# Patient Record
Sex: FEMALE | Race: WHITE
[De-identification: names, ages, dates, MRNs, and addresses within clinical notes are randomized per-mention and may not be internally consistent; named-entity substitution may affect disease eponyms.]

---

## 2018-10-18 ENCOUNTER — HOSPITAL ENCOUNTER (OUTPATIENT)
Dept: HOSPITAL 80 - FIMAGING | Age: 48
End: 2018-10-18
Payer: COMMERCIAL

## 2018-10-18 DIAGNOSIS — Z12.31: Primary | ICD-10-CM

## 2018-10-31 ENCOUNTER — HOSPITAL ENCOUNTER (OUTPATIENT)
Dept: HOSPITAL 80 - FIMAGING | Age: 48
End: 2018-10-31
Payer: COMMERCIAL

## 2018-10-31 DIAGNOSIS — R92.8: Primary | ICD-10-CM

## 2019-04-11 ENCOUNTER — HOSPITAL ENCOUNTER (EMERGENCY)
Dept: HOSPITAL 80 - FED | Age: 49
Discharge: HOME | End: 2019-04-11
Payer: COMMERCIAL

## 2019-04-11 VITALS — SYSTOLIC BLOOD PRESSURE: 148 MMHG | DIASTOLIC BLOOD PRESSURE: 95 MMHG

## 2019-04-11 DIAGNOSIS — M25.512: ICD-10-CM

## 2019-04-11 DIAGNOSIS — R42: ICD-10-CM

## 2019-04-11 DIAGNOSIS — M51.37: Primary | ICD-10-CM

## 2019-04-11 DIAGNOSIS — M24.852: ICD-10-CM

## 2019-04-11 DIAGNOSIS — E86.9: ICD-10-CM

## 2019-04-11 DIAGNOSIS — Q65.89: ICD-10-CM

## 2019-04-11 LAB — PLATELET # BLD: 352 10^3/UL (ref 150–400)

## 2019-04-11 NOTE — EDPHY
H & P


Stated Complaint: L shoulder pain


Time Seen by Provider: 04/11/19 11:22


HPI/ROS: 


HPI:  This is a 48-year-old female who presents with





Chief Complaint:  Dizziness, left shoulder and left hip pain





Location:  Body


Quality:  Dizziness, pain


Duration:  40 min prior to arrival


Signs and Symptoms:  no shortness of breath at rest, no shortness of breath on 

exertion, no cough, no chest pain, + palpitations, no lower extremity edema, no 

wheezing, no orthopnea, no paroxysmal nocturnal dyspnea, no fever, no injury/

trauma, no hemoptysis, no carpal pedal spasms


Timing:  Acute, constant, lasting approximately 20-30 minutes


Severity:  Mild-to-moderate


Context:  Patient was driving down the [place] for approximately 40 min when she 

started to developed dizziness described as lightheadedness and left shoulder 

pain at the exact same time as left hip pain that radiated down the back of her 

leg into her knee.  She reports that she felt like her heart was racing and 

complained of palpitations.  She reports that she travels a lot for work and is 

under considerable stress.  She has not ate or drank anything today.  Last TSH 

level was performed 2 weeks ago and is followed by her primary care provider.  

Patient reports that 3-5 times per week she wakes up right hip discomfort and 

lower back pain that radiates into her right leg but has never had any imaging 

performed.  Denies any recent trauma or injury.  No fever and no recent upper 

respiratory symptoms.  Denies chest pain, shortness of breath, lower extremity 

edema, calf pain.  Denies urinary symptoms including no dysuria, urinary 

hesitancy, urinary frequency.


Modifying Factors:  None





Comment: 








ROS:  A comprehensive 10 system review of systems is otherwise negative aside 

from elements mentioned in the history of present illness. 





MEDICAL/SURGICAL/SOCIAL HISTORY: 


Medical history:  Hypothyroidism


Surgical history:  Denies


Social history:  Employed.  Never smoked.











CONSTITUTIONAL:  Overweight but nontoxic-appearing polite and cooperative female

, awake and alert, no obvious distress


HEENT: Atraumatic and normocephalic, PERRL, EOMI.  Nares patent; no rhinorrhea;

  no nasal mucosal edema. Tympanic membranes clear. Oropharynx clear, no 

exudate and moist pink mucosa.  Airway patent.  No lymphadenopathy.  No 

meningismus.  No carotid bruits.


Cardiovascular: Normal S1/S2, regular rate, regular rhythm, without murmur rub 

or gallop.


PULMONARY/CHEST:  Symmetrical and nontender. Clear to auscultation bilaterally. 

Good air movement. No accessory muscle usage.


ABDOMEN:  Soft, nondistended, nontender, no rebound, no guarding, no peritoneal 

signs, no masses or organomegaly. No CVAT.


BACK:  No midline tenderness, no paraspinous spasm, deep tendon reflexes 2/2, 

no pain with straight leg raise, No foot drop.  Achilles reflexes are equal 

bilaterally.  Able to walk on heels and toes without difficulty.


EXTREMITIES:  2/2 pulses, strength 5/5, bilateral HIP:  Flexion to 125, 

extension to 115, hyper extension to 15, abduction to 45. Pain with internal 

rotation and external rotation.  Mild tenderness over greater trochanter. no 

deformities, no clubbing, no cyanosis or edema.


NEUROLOGICAL: no focal neuro deficits.  GCS 15. Cranial nerves 2-12 grossly 

intact.


SKIN: Warm and dry, no erythema. no rash.  Good capillary refill. 








  





Source: Patient


Exam Limitations: No limitations





- Personal History


Current Tetanus/Diphtheria Vaccine: No


Current Tetanus Diphtheria and Acellular Pertussis (TDAP): No





- Medical/Surgical History


Hx Asthma: No


Hx Chronic Respiratory Disease: No


Hx Diabetes: No


Hx Cardiac Disease: No


Hx Renal Disease: No


Hx Cirrhosis: No


Hx Alcoholism: No


Hx HIV/AIDS: No


Hx Splenectomy or Spleen Trauma: No


Other PMH: hypothyroid





- Social History


Smoking Status: Never smoked


Constitutional: 


 Initial Vital Signs











Temperature (C)  37.4 C   04/11/19 11:16


 


Heart Rate  92   04/11/19 11:16


 


Respiratory Rate  16   04/11/19 11:16


 


Blood Pressure  168/107 H  04/11/19 11:16


 


O2 Sat (%)  95   04/11/19 11:16








 











O2 Delivery Mode               Room Air














Allergies/Adverse Reactions: 


 





No Known Allergies Allergy (Unverified 04/11/19 11:16)


 








Home Medications: 














 Medication  Instructions  Recorded


 


Levothyroxine  04/11/19














Medical Decision Making





- Diagnostics


EKG Interpretation: 


12 lead EKG:


Indication:  Dizziness


Rhythm:  Normal sinus rhythm, rate of 85 beats per minute


Axis:  Normal


Intervals:  Normal


QRS:  Normal


ST segments:  Nonspecific changes


T segments:  Inverted T-wave in V2


INTERPRETATION:  No acute ischemic changes


The 12 lead EKG was interpreted by myself and with attending.





Imaging Results: 


 Imaging Impressions





Lumbar Spine X-Ray  04/11/19 11:27


Impression: Degenerative disk disease at L5-S1.


 


2. AP Pelvis


 


History: Left hip pain


 


Findings: There is mild hypertrophic change of each anterior superior iliac 

crest. The patient has congenitally short acetabuli. Both femoral necks are 

congenitally under tubulated. The femoral heads are normally located. The hip 

joint cartilage spaces are symmetric and normal. There is no soft tissue 

calcification or ossification. There is no sclerosis, subcortical cyst formation

, spurring or erosive change. The SI joints and pubic symphysis look normal. 

There is no evidence for ischemic necrosis.


 


Impression: Anatomy that would predispose this patient to acetabular labral 

degeneration.








Pelvis X-Ray  04/11/19 11:27


Impression: Degenerative disk disease at L5-S1.


 


2. AP Pelvis


 


History: Left hip pain


 


Findings: There is mild hypertrophic change of each anterior superior iliac 

crest. The patient has congenitally short acetabuli. Both femoral necks are 

congenitally under tubulated. The femoral heads are normally located. The hip 

joint cartilage spaces are symmetric and normal. There is no soft tissue 

calcification or ossification. There is no sclerosis, subcortical cyst formation

, spurring or erosive change. The SI joints and pubic symphysis look normal. 

There is no evidence for ischemic necrosis.


 


Impression: Anatomy that would predispose this patient to acetabular labral 

degeneration.











ED Course/Re-evaluation: 


Vital signs reviewed and shows elevated blood pressure upon arrival.  Placed on 

cardiac monitor.


IV access, laboratory studies, urinalysis, EKG, lumbar sacral x-rays, pelvic x-

rays ordered


Given 1 L normal saline


1140:  EKG my read shows no acute ischemic changes, no arrhythmias, no heart 

block.


1208:  Notified by tech that troponin 0.01


Urinalysis is unremarkable.


1215:  Laboratory studies reviewed.  No signs of leukocytosis/anemia/platelet 

dysfunction/GAUTAM/elevated LFTs/electrolyte imbalance/VTE. 


Lumbosacral x-rays my read show degenerative changes and stenosis at the L5-S1.

  Hip x-ray shows shortened acetabulum with concerns for labral derangement 

versus mild degenerative changes.


1220: Patient would benefit from a referral to Orthopedics.





No signs of neurovascular compromise/tenting of skin/compartment syndrome/

extremities and joints examined above and below area of concern and are 

neurovascularly intact/CVA/TIA/acute coronary syndrome/sepsis. 





This patient was seen under the supervision of my secondary supervising 

physician.  I evaluated care for this patient independently.  





Differential Diagnosis: 


Dizziness including but not limited to peripheral and central causes of vertigo

, orthostatic causes including dehydration, and blood loss.








- Data Points


Laboratory Results: 


 Laboratory Results





 04/11/19 11:44 





 04/11/19 11:44 





 











  04/11/19 04/11/19 04/11/19





  11:47 11:44 11:44


 


WBC      





    


 


RBC      





    


 


Hgb      





    


 


Hct      





    


 


MCV      





    


 


MCH      





    


 


MCHC      





    


 


RDW      





    


 


Plt Count      





    


 


MPV      





    


 


Neut % (Auto)      





    


 


Lymph % (Auto)      





    


 


Mono % (Auto)      





    


 


Eos % (Auto)      





    


 


Baso % (Auto)      





    


 


Nucleat RBC Rel Count      





    


 


Absolute Neuts (auto)      





    


 


Absolute Lymphs (auto)      





    


 


Absolute Monos (auto)      





    


 


Absolute Eos (auto)      





    


 


Absolute Basos (auto)      





    


 


Absolute Nucleated RBC      





    


 


Immature Gran %      





    


 


Immature Gran #      





    


 


D-Dimer      





    


 


Sodium      135 mEq/L mEq/L





     (135-145) 


 


Potassium      4.3 mEq/L mEq/L





     (3.5-5.2) 


 


Chloride      101 mEq/L mEq/L





     () 


 


Carbon Dioxide      25 mEq/l mEq/l





     (22-31) 


 


Anion Gap      9 mEq/L mEq/L





     (6-14) 


 


BUN      11 mg/dL mg/dL





     (7-23) 


 


Creatinine      0.7 mg/dL mg/dL





     (0.6-1.0) 


 


Estimated GFR      > 60 





    


 


Glucose      100 mg/dL mg/dL





     () 


 


Calcium      9.3 mg/dL mg/dL





     (8.5-10.4) 


 


Total Bilirubin      0.4 mg/dL mg/dL





     (0.1-1.4) 


 


Conjugated Bilirubin      0.3 mg/dL mg/dL





     (0.0-0.5) 


 


Unconjugated Bilirubin      0.1 mg/dL mg/dL





     (0.0-1.1) 


 


AST      23 IU/L IU/L





     (14-46) 


 


ALT      46 IU/L IU/L





     (9-52) 


 


Alkaline Phosphatase      68 IU/L IU/L





     () 


 


POC Troponin I  0.01 ng/mL ng/mL    





   (0.00-0.08)   


 


Total Protein      7.5 g/dL g/dL





     (6.3-8.2) 


 


Albumin      4.6 g/dL g/dL





     (3.5-5.0) 


 


Beta HCG, Qual    NEGATIVE   





    


 


Urine Color      





    


 


Urine Appearance      





    


 


Urine pH      





    


 


Ur Specific Gravity      





    


 


Urine Protein      





    


 


Urine Ketones      





    


 


Urine Blood      





    


 


Urine Nitrate      





    


 


Urine Bilirubin      





    


 


Urine Urobilinogen      





    


 


Ur Leukocyte Esterase      





    


 


Urine Glucose      





    














  04/11/19 04/11/19 04/11/19





  11:44 11:44 11:32


 


WBC    5.99 10^3/uL 10^3/uL  





    (3.80-9.50)  


 


RBC    4.71 10^6/uL 10^6/uL  





    (4.18-5.33)  


 


Hgb    14.9 g/dL g/dL  





    (12.6-16.3)  


 


Hct    44.3 % %  





    (38.0-47.0)  


 


MCV    94.1 fL fL  





    (81.5-99.8)  


 


MCH    31.6 pg pg  





    (27.9-34.1)  


 


MCHC    33.6 g/dL g/dL  





    (32.4-36.7)  


 


RDW    12.4 % %  





    (11.5-15.2)  


 


Plt Count    352 10^3/uL 10^3/uL  





    (150-400)  


 


MPV    9.2 fL fL  





    (8.7-11.7)  


 


Neut % (Auto)    54.6 % %  





    (39.3-74.2)  


 


Lymph % (Auto)    35.4 % %  





    (15.0-45.0)  


 


Mono % (Auto)    8.2 % %  





    (4.5-13.0)  


 


Eos % (Auto)    1.2 % %  





    (0.6-7.6)  


 


Baso % (Auto)    0.3 % %  





    (0.3-1.7)  


 


Nucleat RBC Rel Count    0.0 % %  





    (0.0-0.2)  


 


Absolute Neuts (auto)    3.27 10^3/uL 10^3/uL  





    (1.70-6.50)  


 


Absolute Lymphs (auto)    2.12 10^3/uL 10^3/uL  





    (1.00-3.00)  


 


Absolute Monos (auto)    0.49 10^3/uL 10^3/uL  





    (0.30-0.80)  


 


Absolute Eos (auto)    0.07 10^3/uL 10^3/uL  





    (0.03-0.40)  


 


Absolute Basos (auto)    0.02 10^3/uL 10^3/uL  





    (0.02-0.10)  


 


Absolute Nucleated RBC    0.00 10^3/uL 10^3/uL  





    (0-0.01)  


 


Immature Gran %    0.3 % %  





    (0.0-1.1)  


 


Immature Gran #    0.02 10^3/uL 10^3/uL  





    (0.00-0.10)  


 


D-Dimer  0.32 ug/mLFEU ug/mLFEU    





   (0.00-0.50)   


 


Sodium      





    


 


Potassium      





    


 


Chloride      





    


 


Carbon Dioxide      





    


 


Anion Gap      





    


 


BUN      





    


 


Creatinine      





    


 


Estimated GFR      





    


 


Glucose      





    


 


Calcium      





    


 


Total Bilirubin      





    


 


Conjugated Bilirubin      





    


 


Unconjugated Bilirubin      





    


 


AST      





    


 


ALT      





    


 


Alkaline Phosphatase      





    


 


POC Troponin I      





    


 


Total Protein      





    


 


Albumin      





    


 


Beta HCG, Qual      





    


 


Urine Color      YELLOW 





    


 


Urine Appearance      CLEAR 





    


 


Urine pH      6.0 





     (5.0-7.5) 


 


Ur Specific Gravity      1.008 





     (1.002-1.030) 


 


Urine Protein      NEGATIVE 





     (NEGATIVE) 


 


Urine Ketones      NEGATIVE 





     (NEGATIVE) 


 


Urine Blood      NEGATIVE 





     (NEGATIVE) 


 


Urine Nitrate      NEGATIVE 





     (NEGATIVE) 


 


Urine Bilirubin      NEGATIVE 





     (NEGATIVE) 


 


Urine Urobilinogen      NEGATIVE EU EU





     (0.2-1.0) 


 


Ur Leukocyte Esterase      NEGATIVE 





     (NEGATIVE) 


 


Urine Glucose      NEGATIVE 





     (NEGATIVE) 











Medications Given: 


 








Discontinued Medications





Sodium Chloride (Ns)  1,000 mls @ 0 mls/hr IV EDNOW ONE; Wide Open


   PRN Reason: Protocol


   Stop: 04/11/19 11:27


   Last Admin: 04/11/19 11:41 Dose:  1,000 mls





Point of Care Test Results: 


 Chemistry











  04/11/19





  11:47


 


POC Troponin I  0.01 ng/mL ng/mL





   (0.00-0.08) 














Departure





- Departure


Disposition: Home, Routine, Self-Care


Clinical Impression: 


 Lumbar degenerative disc disease, Degenerative tear of acetabular labrum





Condition: Good


Instructions:  Degenerative Disc Disease (ED), Hip Pain (ED)


Additional Instructions: 


Consume a minimum of 8-10 glasses of water or electrolyte fluid replacement 

drinks that include Gatorade, Powerade, Pedialyte. 


Please eat balanced meals at a minimum of 3 times daily.  Do not skip meals.


Follow-up with Orthopedics to discuss lower back pain and hip pain. 








Return to the ER immediately if you experience new or worsening pain, 

discoloration, numbness, tingling, or any other symptoms that concern you.





Referrals: 


Margarito Alves MD [Primary Care Provider] - As per Instructions


Margarito Galeas MD [Medical Doctor] - As per Instructions